# Patient Record
Sex: MALE | Race: OTHER | NOT HISPANIC OR LATINO
[De-identification: names, ages, dates, MRNs, and addresses within clinical notes are randomized per-mention and may not be internally consistent; named-entity substitution may affect disease eponyms.]

---

## 2018-07-30 PROBLEM — Z83.3 FAMILY HISTORY OF TYPE 2 DIABETES MELLITUS: Status: ACTIVE | Noted: 2018-07-30

## 2018-07-30 PROBLEM — M79.1 MUSCLE PAIN: Status: ACTIVE | Noted: 2018-07-30

## 2018-07-30 PROBLEM — M62.81 MUSCLE WEAKNESS: Status: ACTIVE | Noted: 2018-07-30

## 2018-07-30 PROBLEM — Z87.442 HISTORY OF KIDNEY STONES: Status: RESOLVED | Noted: 2018-07-30 | Resolved: 2018-07-30

## 2018-07-31 ENCOUNTER — APPOINTMENT (OUTPATIENT)
Dept: PEDIATRIC MEDICAL GENETICS | Facility: CLINIC | Age: 58
End: 2018-07-31
Payer: COMMERCIAL

## 2018-07-31 DIAGNOSIS — Z87.442 PERSONAL HISTORY OF URINARY CALCULI: ICD-10-CM

## 2018-07-31 DIAGNOSIS — M62.81 MUSCLE WEAKNESS (GENERALIZED): ICD-10-CM

## 2018-07-31 DIAGNOSIS — Z83.3 FAMILY HISTORY OF DIABETES MELLITUS: ICD-10-CM

## 2018-07-31 DIAGNOSIS — M79.1 MYALGIA: ICD-10-CM

## 2018-07-31 DIAGNOSIS — R79.89 OTHER SPECIFIED ABNORMAL FINDINGS OF BLOOD CHEMISTRY: ICD-10-CM

## 2018-07-31 LAB
ALBUMIN SERPL ELPH-MCNC: 5 G/DL
ALP BLD-CCNC: 63 U/L
ALT SERPL-CCNC: 41 U/L
ANION GAP SERPL CALC-SCNC: 15 MMOL/L
ANION GAP SERPL CALC-SCNC: 19 MMOL/L
AST SERPL-CCNC: 29 U/L
BILIRUB SERPL-MCNC: 0.5 MG/DL
BUN SERPL-MCNC: 17 MG/DL
BUN SERPL-MCNC: 18 MG/DL
CALCIUM SERPL-MCNC: 9.4 MG/DL
CALCIUM SERPL-MCNC: 9.9 MG/DL
CHLORIDE SERPL-SCNC: 103 MMOL/L
CHLORIDE SERPL-SCNC: 104 MMOL/L
CK SERPL-CCNC: 90 U/L
CO2 SERPL-SCNC: 22 MMOL/L
CO2 SERPL-SCNC: 24 MMOL/L
CREAT SERPL-MCNC: 0.97 MG/DL
CREAT SERPL-MCNC: 1.09 MG/DL
GLUCOSE SERPL-MCNC: 108 MG/DL
GLUCOSE SERPL-MCNC: 83 MG/DL
LACTATE BLDA-MCNC: 1.2 MMOL/L
LACTATE BLDA-MCNC: 5 MMOL/L
POTASSIUM SERPL-SCNC: 4.9 MMOL/L
POTASSIUM SERPL-SCNC: 4.9 MMOL/L
PROT SERPL-MCNC: 6.9 G/DL
SODIUM SERPL-SCNC: 143 MMOL/L
SODIUM SERPL-SCNC: 144 MMOL/L

## 2018-07-31 PROCEDURE — XXXXX: CPT

## 2018-08-02 PROBLEM — R79.89 DECREASED TESTOSTERONE LEVEL: Status: ACTIVE | Noted: 2018-08-02

## 2018-08-03 LAB
AMINO ACIDS FLD-SCNC: NORMAL
CARN ESTERS SERPL-MCNC: 14.4 UMOL/L
CARNITINE FREE SERPL-SCNC: 61.9 UMOL/L
CARNITINE FREE SFR SERPL: 0.2 UMOL/L
CARNITINE SERPL-SCNC: 76.3 UMOL/L
ORGANIC ACIDS UR-MCNC: NORMAL

## 2018-08-05 LAB — ACYLCARNITINE SERPL-MCNC: NORMAL

## 2018-08-07 LAB
TESTOST BND SERPL-MCNC: 15.4 PG/ML
TESTOST SERPL-MCNC: 349.7 NG/DL

## 2020-07-28 ENCOUNTER — TRANSCRIPTION ENCOUNTER (OUTPATIENT)
Age: 60
End: 2020-07-28

## 2020-09-21 ENCOUNTER — APPOINTMENT (OUTPATIENT)
Dept: MRI IMAGING | Facility: CLINIC | Age: 60
End: 2020-09-21

## 2022-02-07 ENCOUNTER — TRANSCRIPTION ENCOUNTER (OUTPATIENT)
Age: 62
End: 2022-02-07

## 2025-04-10 ENCOUNTER — INPATIENT (INPATIENT)
Facility: HOSPITAL | Age: 65
LOS: 0 days | Discharge: LEFT BEFORE DISCHARGE | DRG: 313 | End: 2025-04-11
Attending: INTERNAL MEDICINE | Admitting: INTERNAL MEDICINE
Payer: COMMERCIAL

## 2025-04-10 VITALS
OXYGEN SATURATION: 98 % | TEMPERATURE: 98 F | SYSTOLIC BLOOD PRESSURE: 168 MMHG | RESPIRATION RATE: 16 BRPM | HEIGHT: 65 IN | HEART RATE: 71 BPM | WEIGHT: 149.91 LBS | DIASTOLIC BLOOD PRESSURE: 82 MMHG

## 2025-04-10 DIAGNOSIS — R07.9 CHEST PAIN, UNSPECIFIED: ICD-10-CM

## 2025-04-10 LAB
ALBUMIN SERPL ELPH-MCNC: 4.6 G/DL — SIGNIFICANT CHANGE UP (ref 3.3–5)
ALP SERPL-CCNC: 68 U/L — SIGNIFICANT CHANGE UP (ref 40–120)
ALT FLD-CCNC: 25 U/L — SIGNIFICANT CHANGE UP (ref 10–45)
APTT BLD: 34.1 SEC — SIGNIFICANT CHANGE UP (ref 24.5–35.6)
AST SERPL-CCNC: 27 U/L — SIGNIFICANT CHANGE UP (ref 10–40)
BASOPHILS # BLD AUTO: 0.06 K/UL — SIGNIFICANT CHANGE UP (ref 0–0.2)
BASOPHILS NFR BLD AUTO: 1.1 % — SIGNIFICANT CHANGE UP (ref 0–2)
BILIRUB SERPL-MCNC: 0.3 MG/DL — SIGNIFICANT CHANGE UP (ref 0.2–1.2)
BUN SERPL-MCNC: 16 MG/DL — SIGNIFICANT CHANGE UP (ref 7–23)
CALCIUM SERPL-MCNC: 9.6 MG/DL — SIGNIFICANT CHANGE UP (ref 8.4–10.5)
CHLORIDE SERPL-SCNC: 104 MMOL/L — SIGNIFICANT CHANGE UP (ref 96–108)
CO2 SERPL-SCNC: 21 MMOL/L — LOW (ref 22–31)
CREAT SERPL-MCNC: 1.17 MG/DL — SIGNIFICANT CHANGE UP (ref 0.5–1.3)
EGFR: 70 ML/MIN/1.73M2 — SIGNIFICANT CHANGE UP
EGFR: 70 ML/MIN/1.73M2 — SIGNIFICANT CHANGE UP
EOSINOPHIL # BLD AUTO: 0.12 K/UL — SIGNIFICANT CHANGE UP (ref 0–0.5)
EOSINOPHIL NFR BLD AUTO: 2.2 % — SIGNIFICANT CHANGE UP (ref 0–6)
GLUCOSE SERPL-MCNC: 131 MG/DL — HIGH (ref 70–99)
HCT VFR BLD CALC: 45.7 % — SIGNIFICANT CHANGE UP (ref 39–50)
HGB BLD-MCNC: 15.4 G/DL — SIGNIFICANT CHANGE UP (ref 13–17)
IMM GRANULOCYTES NFR BLD AUTO: 0.4 % — SIGNIFICANT CHANGE UP (ref 0–0.9)
INR BLD: 0.92 RATIO — SIGNIFICANT CHANGE UP (ref 0.85–1.16)
LYMPHOCYTES # BLD AUTO: 1.7 K/UL — SIGNIFICANT CHANGE UP (ref 1–3.3)
LYMPHOCYTES # BLD AUTO: 31.1 % — SIGNIFICANT CHANGE UP (ref 13–44)
MCHC RBC-ENTMCNC: 29.4 PG — SIGNIFICANT CHANGE UP (ref 27–34)
MCHC RBC-ENTMCNC: 33.7 G/DL — SIGNIFICANT CHANGE UP (ref 32–36)
MCV RBC AUTO: 87.2 FL — SIGNIFICANT CHANGE UP (ref 80–100)
MONOCYTES # BLD AUTO: 0.54 K/UL — SIGNIFICANT CHANGE UP (ref 0–0.9)
MONOCYTES NFR BLD AUTO: 9.9 % — SIGNIFICANT CHANGE UP (ref 2–14)
NEUTROPHILS # BLD AUTO: 3.03 K/UL — SIGNIFICANT CHANGE UP (ref 1.8–7.4)
NEUTROPHILS NFR BLD AUTO: 55.3 % — SIGNIFICANT CHANGE UP (ref 43–77)
NRBC BLD AUTO-RTO: 0 /100 WBCS — SIGNIFICANT CHANGE UP (ref 0–0)
NT-PROBNP SERPL-SCNC: <36 PG/ML — SIGNIFICANT CHANGE UP (ref 0–300)
PLATELET # BLD AUTO: 201 K/UL — SIGNIFICANT CHANGE UP (ref 150–400)
POTASSIUM SERPL-MCNC: 4.2 MMOL/L — SIGNIFICANT CHANGE UP (ref 3.5–5.3)
POTASSIUM SERPL-SCNC: 4.2 MMOL/L — SIGNIFICANT CHANGE UP (ref 3.5–5.3)
PROT SERPL-MCNC: 6.8 G/DL — SIGNIFICANT CHANGE UP (ref 6–8.3)
PROTHROM AB SERPL-ACNC: 10.5 SEC — SIGNIFICANT CHANGE UP (ref 9.9–13.4)
RBC # BLD: 5.24 M/UL — SIGNIFICANT CHANGE UP (ref 4.2–5.8)
RBC # FLD: 13.3 % — SIGNIFICANT CHANGE UP (ref 10.3–14.5)
SODIUM SERPL-SCNC: 143 MMOL/L — SIGNIFICANT CHANGE UP (ref 135–145)
TROPONIN T, HIGH SENSITIVITY RESULT: 8 NG/L — SIGNIFICANT CHANGE UP (ref 0–51)
WBC # BLD: 5.47 K/UL — SIGNIFICANT CHANGE UP (ref 3.8–10.5)
WBC # FLD AUTO: 5.47 K/UL — SIGNIFICANT CHANGE UP (ref 3.8–10.5)

## 2025-04-10 PROCEDURE — 93010 ELECTROCARDIOGRAM REPORT: CPT

## 2025-04-10 PROCEDURE — 99285 EMERGENCY DEPT VISIT HI MDM: CPT

## 2025-04-10 PROCEDURE — 71045 X-RAY EXAM CHEST 1 VIEW: CPT | Mod: 26

## 2025-04-10 NOTE — ED ADULT NURSE NOTE - NSSEPSISSUSPECTED_ED_A_ED
----- Message from Salome Ashford MD sent at 4/30/2021  8:12 AM CDT -----  No dm2. Prediabetes noted on fbs.  Low carb/sugar diet  Renal/lft are normal  UA is bland  flp is mild elevation of t chol and ldl but what concerns me is that her 10 year cad risk is No

## 2025-04-10 NOTE — ED PROVIDER NOTE - ATTENDING CONTRIBUTION TO CARE
Patient 64-year-old male history of hypertension high cholesterol presenting with chest pain that started this evening associated with recent CT coronary performed showing 70% blockage of his proximal LAD started on cholesterol medication elevated calcium score patient currently pain-free with no EKG changes but in light of coronary results will likely need admission for possible cath.

## 2025-04-10 NOTE — ED PROVIDER NOTE - PROGRESS NOTE DETAILS
JEF Ferguson PGY2- spoke with Dr. Parada, asked for admit to Dr. Enrique with plan for cath in AM by house cards. spoke with Dr. Enrique in regards to plan, will admit. Dr. Parada cell phone [phone]

## 2025-04-10 NOTE — ED PROVIDER NOTE - OBJECTIVE STATEMENT
The patient is a 65 y/o M with past medical history of HTN, HLD, presenting to the ED with left sided chest pain. Lasted approx 60-90 minutes, started while at rest. Patient had CT coronary approx 1 week ago done by his cardiologist Behzad Paimany ([place]) which showed 70% blockage of the LAD, was started on injectable cholesterol medication (first dose today), instructed to go to the ED if he developed any symptoms such as chest pain. CP today was associated with some shortness of breath, resolved after receiving 243 ASA by EMS. The patient is a 63 y/o M with past medical history of HTN, HLD, presenting to the ED with left sided chest pain. Lasted approx 60-90 minutes, started while at rest. Patient had CT coronary approx 1 week ago done by his cardiologist Behzad Paimany ([place]) which showed 70% blockage of the LAD, was started on injectable cholesterol medication (first dose today), instructed to go to the ED if he developed any symptoms such as chest pain. CP today was associated with some shortness of breath, resolved after receiving 243 ASA by EMS. No syncope, fever, cough, congestion, nausea, vomiting, or any other symptoms.

## 2025-04-10 NOTE — ED PROVIDER NOTE - PHYSICAL EXAMINATION
General: no acute distress  Psych: mood appropriate  Head: normocephalic; atraumatic  Eyes: conjunctivae clear bilaterally, sclerae anicteric  ENT: no nasal flaring, patent nares  Cardio: regular rate and rhythm; normal heart sounds  Resp: clear to auscultation bilaterally  GI: abdomen soft, nontender, nondistended  Neuro: A&Ox3  Skin: no rashes or bruising noted  MSK: normal movement of extremities  Lymph/Vasc: no LE edema

## 2025-04-10 NOTE — ED ADULT NURSE NOTE - OBJECTIVE STATEMENT
63 y/o male w/ pmh of HLD BIBA to ED c/o chest pain.  Pt states that he started to experience left sided chest pain around 7pm today while moving some boxes around.  Pt denies chest pain now after taking 324 of aspirin.  Pt denies SOB, n/v, dizziness, numbness/tingling, fevers/chills, urinary symptoms.  Pt states he saw his cardiologist recently who sent him for outpatient CT that revealed arterial blockages.  Pt presents to ED for further evaluation.

## 2025-04-11 ENCOUNTER — TRANSCRIPTION ENCOUNTER (OUTPATIENT)
Age: 65
End: 2025-04-11

## 2025-04-11 VITALS
OXYGEN SATURATION: 96 % | RESPIRATION RATE: 18 BRPM | TEMPERATURE: 98 F | DIASTOLIC BLOOD PRESSURE: 72 MMHG | SYSTOLIC BLOOD PRESSURE: 128 MMHG | HEART RATE: 64 BPM

## 2025-04-11 LAB
ANION GAP SERPL CALC-SCNC: 11 MMOL/L — SIGNIFICANT CHANGE UP (ref 5–17)
BUN SERPL-MCNC: 14 MG/DL — SIGNIFICANT CHANGE UP (ref 7–23)
CALCIUM SERPL-MCNC: 9.4 MG/DL — SIGNIFICANT CHANGE UP (ref 8.4–10.5)
CHLORIDE SERPL-SCNC: 106 MMOL/L — SIGNIFICANT CHANGE UP (ref 96–108)
CO2 SERPL-SCNC: 23 MMOL/L — SIGNIFICANT CHANGE UP (ref 22–31)
CREAT SERPL-MCNC: 1.17 MG/DL — SIGNIFICANT CHANGE UP (ref 0.5–1.3)
EGFR: 70 ML/MIN/1.73M2 — SIGNIFICANT CHANGE UP
EGFR: 70 ML/MIN/1.73M2 — SIGNIFICANT CHANGE UP
GLUCOSE SERPL-MCNC: 100 MG/DL — HIGH (ref 70–99)
HCT VFR BLD CALC: 50 % — SIGNIFICANT CHANGE UP (ref 39–50)
HGB BLD-MCNC: 16.3 G/DL — SIGNIFICANT CHANGE UP (ref 13–17)
MCHC RBC-ENTMCNC: 28.7 PG — SIGNIFICANT CHANGE UP (ref 27–34)
MCHC RBC-ENTMCNC: 32.6 G/DL — SIGNIFICANT CHANGE UP (ref 32–36)
MCV RBC AUTO: 88.2 FL — SIGNIFICANT CHANGE UP (ref 80–100)
NRBC BLD AUTO-RTO: 0 /100 WBCS — SIGNIFICANT CHANGE UP (ref 0–0)
PLATELET # BLD AUTO: 218 K/UL — SIGNIFICANT CHANGE UP (ref 150–400)
POTASSIUM SERPL-MCNC: 4.6 MMOL/L — SIGNIFICANT CHANGE UP (ref 3.5–5.3)
POTASSIUM SERPL-SCNC: 4.6 MMOL/L — SIGNIFICANT CHANGE UP (ref 3.5–5.3)
RBC # BLD: 5.67 M/UL — SIGNIFICANT CHANGE UP (ref 4.2–5.8)
RBC # FLD: 13.4 % — SIGNIFICANT CHANGE UP (ref 10.3–14.5)
SODIUM SERPL-SCNC: 140 MMOL/L — SIGNIFICANT CHANGE UP (ref 135–145)
TROPONIN T, HIGH SENSITIVITY RESULT: 7 NG/L — SIGNIFICANT CHANGE UP (ref 0–51)
WBC # BLD: 5.85 K/UL — SIGNIFICANT CHANGE UP (ref 3.8–10.5)
WBC # FLD AUTO: 5.85 K/UL — SIGNIFICANT CHANGE UP (ref 3.8–10.5)

## 2025-04-11 PROCEDURE — 85610 PROTHROMBIN TIME: CPT

## 2025-04-11 PROCEDURE — C1894: CPT

## 2025-04-11 PROCEDURE — C1769: CPT

## 2025-04-11 PROCEDURE — 92928 PRQ TCAT PLMT NTRAC ST 1 LES: CPT | Mod: LD

## 2025-04-11 PROCEDURE — 99152 MOD SED SAME PHYS/QHP 5/>YRS: CPT

## 2025-04-11 PROCEDURE — 93458 L HRT ARTERY/VENTRICLE ANGIO: CPT

## 2025-04-11 PROCEDURE — 93005 ELECTROCARDIOGRAM TRACING: CPT

## 2025-04-11 PROCEDURE — 71045 X-RAY EXAM CHEST 1 VIEW: CPT

## 2025-04-11 PROCEDURE — C9600: CPT | Mod: LD

## 2025-04-11 PROCEDURE — 80053 COMPREHEN METABOLIC PANEL: CPT

## 2025-04-11 PROCEDURE — 93010 ELECTROCARDIOGRAM REPORT: CPT

## 2025-04-11 PROCEDURE — C1725: CPT

## 2025-04-11 PROCEDURE — 84484 ASSAY OF TROPONIN QUANT: CPT

## 2025-04-11 PROCEDURE — 99285 EMERGENCY DEPT VISIT HI MDM: CPT

## 2025-04-11 PROCEDURE — 80048 BASIC METABOLIC PNL TOTAL CA: CPT

## 2025-04-11 PROCEDURE — C1874: CPT

## 2025-04-11 PROCEDURE — 85027 COMPLETE CBC AUTOMATED: CPT

## 2025-04-11 PROCEDURE — 85730 THROMBOPLASTIN TIME PARTIAL: CPT

## 2025-04-11 PROCEDURE — 93458 L HRT ARTERY/VENTRICLE ANGIO: CPT | Mod: 26,59

## 2025-04-11 PROCEDURE — 83880 ASSAY OF NATRIURETIC PEPTIDE: CPT

## 2025-04-11 PROCEDURE — 85025 COMPLETE CBC W/AUTO DIFF WBC: CPT

## 2025-04-11 PROCEDURE — C1887: CPT

## 2025-04-11 RX ORDER — CLOPIDOGREL BISULFATE 75 MG/1
75 TABLET, FILM COATED ORAL DAILY
Refills: 0 | Status: DISCONTINUED | OUTPATIENT
Start: 2025-04-12 | End: 2025-04-11

## 2025-04-11 RX ORDER — ASPIRIN 325 MG
1 TABLET ORAL
Qty: 30 | Refills: 0
Start: 2025-04-11 | End: 2025-05-10

## 2025-04-11 RX ORDER — ASPIRIN 325 MG
1 TABLET ORAL
Refills: 0 | DISCHARGE

## 2025-04-11 RX ORDER — CLOPIDOGREL BISULFATE 75 MG/1
1 TABLET, FILM COATED ORAL
Qty: 90 | Refills: 3
Start: 2025-04-11 | End: 2026-04-05

## 2025-04-11 RX ORDER — ASPIRIN 325 MG
81 TABLET ORAL DAILY
Refills: 0 | Status: DISCONTINUED | OUTPATIENT
Start: 2025-04-11 | End: 2025-04-11

## 2025-04-11 RX ORDER — CLOPIDOGREL BISULFATE 75 MG/1
1 TABLET, FILM COATED ORAL
Qty: 30 | Refills: 0
Start: 2025-04-11 | End: 2025-05-10

## 2025-04-11 RX ORDER — EVOLOCUMAB 140 MG/ML
140 INJECTION, SOLUTION SUBCUTANEOUS
Refills: 0 | DISCHARGE

## 2025-04-11 RX ORDER — ASPIRIN 325 MG
1 TABLET ORAL
Qty: 90 | Refills: 3
Start: 2025-04-11 | End: 2026-04-05

## 2025-04-11 RX ORDER — ACETAMINOPHEN 500 MG/5ML
650 LIQUID (ML) ORAL EVERY 6 HOURS
Refills: 0 | Status: DISCONTINUED | OUTPATIENT
Start: 2025-04-11 | End: 2025-04-11

## 2025-04-11 RX ORDER — ASPIRIN 325 MG
81 TABLET ORAL ONCE
Refills: 0 | Status: COMPLETED | OUTPATIENT
Start: 2025-04-11 | End: 2025-04-11

## 2025-04-11 RX ADMIN — Medication 81 MILLIGRAM(S): at 12:44

## 2025-04-11 RX ADMIN — Medication 75 MILLILITER(S): at 12:37

## 2025-04-11 RX ADMIN — Medication 100 MILLILITER(S): at 16:11

## 2025-04-11 RX ADMIN — Medication 500 MILLILITER(S): at 12:37

## 2025-04-11 NOTE — CHART NOTE - NSCHARTNOTEFT_GEN_A_CORE
Notified by RN patient wanting to sign out AMA. Asked patient why he wanted to leave, reports "    Patient is AAO x 3. I explained at length to the patient the risks of signing out AMA including but not limited to harm, injury, or death. I explained the risks, benefits and alternatives to treatment as well as the attendant risks of refusing treatment at this time. I offered to answer any questions and fully answered any such questions. We believe that the patient fully understands what has been explained and answered.   Attending Dr. Enrique notified and made aware of the above. Patient signed AMA form and accepts responsibility for any and all results of this decision.    Return to the ER for any new or worsening symptoms, chest pain, shortness of breath, or if any other concerns.    Pérez Dean PA-C   Department of Medicine Notified by RN patient wanting to sign out AMA. Asked patient why he wanted to leave, reports "I feel fine, I don't have to stay here for tonight. I can't sleep. Dr jean baptiste said it was fine to go."    Patient is AAO x 3. I explained at length to the patient the risks of signing out AMA including but not limited to harm, injury, or death. I explained the risks, benefits and alternatives to treatment as well as the attendant risks of refusing treatment at this time. I offered to answer any questions and fully answered any such questions. I also explained to the patient that we do not have official recommendations from cardiology for post catheterization. We believe that the patient fully understands what has been explained and answered.   Attending Dr. Enrique notified and made aware of the above. Patient signed AMA form and accepts responsibility for any and all results of this decision.    Return to the ER for any new or worsening symptoms, chest pain, shortness of breath, or if any other concerns.    Pérez Dean PA-C   Department of Medicine

## 2025-04-11 NOTE — DISCHARGE NOTE PROVIDER - HOSPITAL COURSE
You have a diagnosis of coronary artery disease and underwent a cardiac catheterization where you received a stent to your LAD coronary artery. You have been started on Aspirin 81mg daily and Plavix (Clopidogrel) 75mg daily. The procedure was done through the wrist. Please avoid any heavy lifting  (no more than 3 to 5 lbs) or strenuous activity for five days. If you develop any swelling, bleeding, hardening of the skin (hematoma formation), acute pain, numbness/tingling  in your arm or leg please contact your doctor immediately or call our 24/7 line: [phone].   NEVER miss a dose of Aspirin and Plavix to ensure your stent does not close. DO NOT STOP THESE MEDICATIONS FOR ANY REASON UNLESS OTHERWISE INDICATED BY YOUR CARDIOLOGIST BECAUSE THIS WILL PUT YOU AT RISK OF YOUR STENT CLOSING AND HAVING A HEART ATTACK OR SUDDEN SEVERE LEG PAIN DUE TO BLOCKAGE OF BLOOD FLOW TO LEG.  You have been given a Stent Card to carry with you in your wallet.  Make Photocopies or take a picture of card so you have a backup copy.  This card has important information for any possible future Radiology/MRI studies.    Please make a follow up appointment with your cardiologist within 1-2 weeks of your discharge. All of your prescriptions have been sent electronically to your pharmacy.    Cardiac Rehab (Post PCI):              *Education on benefits of Cardiac Rehab provided to patient: Yes         *Referral and Prescription Given for Cardiac Rehab : Yes         *Pt given list of locations & instructed to contact their insurance company to review list of participating providers: Yes         *Pt instructed to bring Cardiac Rehab prescription with them to Cardiology Follow up appointment for assistance with enrollment: Yes         *Pt discharged with copies detail cardiovascular history, medications, testing/treatments: Yes       AMA       You have a diagnosis of coronary artery disease and underwent a cardiac catheterization where you received a stent to your LAD coronary artery. You have been started on Aspirin 81mg daily and Plavix (Clopidogrel) 75mg daily. The procedure was done through the wrist. Please avoid any heavy lifting  (no more than 3 to 5 lbs) or strenuous activity for five days. If you develop any swelling, bleeding, hardening of the skin (hematoma formation), acute pain, numbness/tingling  in your arm or leg please contact your doctor immediately or call our 24/7 line: [phone].   NEVER miss a dose of Aspirin and Plavix to ensure your stent does not close. DO NOT STOP THESE MEDICATIONS FOR ANY REASON UNLESS OTHERWISE INDICATED BY YOUR CARDIOLOGIST BECAUSE THIS WILL PUT YOU AT RISK OF YOUR STENT CLOSING AND HAVING A HEART ATTACK OR SUDDEN SEVERE LEG PAIN DUE TO BLOCKAGE OF BLOOD FLOW TO LEG.  You have been given a Stent Card to carry with you in your wallet.  Make Photocopies or take a picture of card so you have a backup copy.  This card has important information for any possible future Radiology/MRI studies.    Please make a follow up appointment with your cardiologist within 1-2 weeks of your discharge. All of your prescriptions have been sent electronically to your pharmacy.    Cardiac Rehab (Post PCI):              *Education on benefits of Cardiac Rehab provided to patient: Yes         *Referral and Prescription Given for Cardiac Rehab : Yes         *Pt given list of locations & instructed to contact their insurance company to review list of participating providers: Yes         *Pt instructed to bring Cardiac Rehab prescription with them to Cardiology Follow up appointment for assistance with enrollment: Yes         *Pt discharged with copies detail cardiovascular history, medications, testing/treatments: Yes       Pt leaving AMA and is not medically cleared for discharge.     Patient is AAO x 3. I explained at length to the patient the risks of signing out AMA including but not limited to harm, injury, or death. I explained the risks, benefits and alternatives to treatment as well as the attendant risks of refusing treatment at this time. I offered to answer any questions and fully answered any such questions. I also explained to the patient that we do not have official recommendations from cardiology for post catheterization. We believe that the patient fully understands what has been explained and answered.   Attending Dr. Enrique notified and made aware of the above. Patient signed AMA form and accepts responsibility for any and all results of this decision.  Return to the ER for any new or worsening symptoms, chest pain, shortness of breath, or if any other concerns.     63 y/o M with past medical history of HTN, HLD, presenting to the ED with left sided chest pain. Lasted approx 60-90 minutes, started while at rest. Patient had CT coronary approx 1 week ago done by his cardiologist Behzad Paimany ([place]) which showed 70% blockage of the LAD, was started on injectable cholesterol medication (first dose today), instructed to go to the ED if he developed any symptoms such as chest pain. CP today was associated with some shortness of breath, resolved after receiving 243 ASA by EMS. No syncope, fever, cough, congestion, nausea, vomiting, or any other symptoms. (11 Apr 2025 08:29)    Hospital Course:  chest pain with abnormal ct coronary  - continue with Aspirin   - cardiac cath today- s/p 1 drug eluting stent to LAD.   - cardio consult pending (pt leaving AMA)     HLD  - cont repatha    You have a diagnosis of coronary artery disease and underwent a cardiac catheterization where you received a stent to your LAD coronary artery. You have been started on Aspirin 81mg daily and Plavix (Clopidogrel) 75mg daily. The procedure was done through the wrist. Please avoid any heavy lifting  (no more than 3 to 5 lbs) or strenuous activity for five days. If you develop any swelling, bleeding, hardening of the skin (hematoma formation), acute pain, numbness/tingling  in your arm or leg please contact your doctor immediately or call our 24/7 line: [phone].   NEVER miss a dose of Aspirin and Plavix to ensure your stent does not close. DO NOT STOP THESE MEDICATIONS FOR ANY REASON UNLESS OTHERWISE INDICATED BY YOUR CARDIOLOGIST BECAUSE THIS WILL PUT YOU AT RISK OF YOUR STENT CLOSING AND HAVING A HEART ATTACK OR SUDDEN SEVERE LEG PAIN DUE TO BLOCKAGE OF BLOOD FLOW TO LEG.  You have been given a Stent Card to carry with you in your wallet.  Make Photocopies or take a picture of card so you have a backup copy.  This card has important information for any possible future Radiology/MRI studies.    Please make a follow up appointment with your cardiologist within 1-2 weeks of your discharge. All of your prescriptions have been sent electronically to your pharmacy.    Cardiac Rehab (Post PCI):              *Education on benefits of Cardiac Rehab provided to patient: Yes         *Referral and Prescription Given for Cardiac Rehab : Yes         *Pt given list of locations & instructed to contact their insurance company to review list of participating providers: Yes         *Pt instructed to bring Cardiac Rehab prescription with them to Cardiology Follow up appointment for assistance with enrollment: Yes         *Pt discharged with copies detail cardiovascular history, medications, testing/treatments: Yes    Important Medication Changes and Reason:  -no changes     Active or Pending Issues Requiring Follow-up:  follow up appointment with your cardiologist within 1-2 weeks of your AMA leave   Advanced Directives:   [x] Full code  [ ] DNR  [ ] Hospice    Discharge Diagnoses:  Chest pain s/p 1 NASIM to LAD   Leaving AMA

## 2025-04-11 NOTE — H&P ADULT - HISTORY OF PRESENT ILLNESS
63 y/o M with past medical history of HTN, HLD, presenting to the ED with left sided chest pain. Lasted approx 60-90 minutes, started while at rest. Patient had CT coronary approx 1 week ago done by his cardiologist Behzad Paimany ([place]) which showed 70% blockage of the LAD, was started on injectable cholesterol medication (first dose today), instructed to go to the ED if he developed any symptoms such as chest pain. CP today was associated with some shortness of breath, resolved after receiving 243 ASA by EMS. No syncope, fever, cough, congestion, nausea, vomiting, or any other symptoms.

## 2025-04-11 NOTE — CONSULT NOTE ADULT - SUBJECTIVE AND OBJECTIVE BOX
DATE OF SERVICE: 04-11-25 @ 09:33    CHIEF COMPLAINT:Patient is a 64y old  Male who presents with a chief complaint of chest pain (11 Apr 2025 08:29)      HISTORY OF PRESENT ILLNESS:HPI:  63 y/o M with past medical history of HTN, HLD, presenting to the ED with left sided chest pain. Lasted approx 60-90 minutes, started while at rest. Patient had CT coronary approx 1 week ago done by his cardiologist Behzad Paimany ([place]) which showed 70% blockage of the LAD, was started on injectable cholesterol medication (first dose today), instructed to go to the ED if he developed any symptoms such as chest pain. CP today was associated with some shortness of breath, resolved after receiving 243 ASA by EMS. No syncope, fever, cough, congestion, nausea, vomiting, or any other symptoms. (11 Apr 2025 08:29)      PAST MEDICAL & SURGICAL HISTORY:  No pertinent past medical history      No significant past surgical history              MEDICATIONS:                  FAMILY HISTORY:      Non-contributory    SOCIAL HISTORY:    [ ] Tobacco  [ ] Drugs  [ ] Alcohol    Allergies    No Known Allergies    Intolerances    	    REVIEW OF SYSTEMS:  CONSTITUTIONAL: No fever  EYES: No eye pain, visual disturbances, or discharge  ENMT:  No difficulty hearing, tinnitus  NECK: No pain or stiffness  RESPIRATORY: No cough, wheezing,  CARDIOVASCULAR: No chest pain, palpitations, passing out, dizziness, or leg swelling  GASTROINTESTINAL:  No nausea, vomiting, diarrhea or constipation. No melena.  GENITOURINARY: No dysuria, hematuria  NEUROLOGICAL: No stroke like symptoms  SKIN: No burning or lesions   ENDOCRINE: No heat or cold intolerance  MUSCULOSKELETAL: No joint pain or swelling  PSYCHIATRIC: No  anxiety, mood swings  HEME/LYMPH: No bleeding gums  ALLERGY AND IMMUNOLOGIC: No hives or eczema	    All other ROS negative    PHYSICAL EXAM:  T(C): 36.5 (04-11-25 @ 04:23), Max: 36.8 (04-10-25 @ 20:37)  HR: 73 (04-11-25 @ 04:23) (67 - 75)  BP: 115/69 (04-11-25 @ 04:23) (115/69 - 168/82)  RR: 18 (04-11-25 @ 04:23) (16 - 18)  SpO2: 98% (04-11-25 @ 04:23) (98% - 98%)  Wt(kg): --  I&O's Summary      Appearance: Normal	  HEENT:   Normal oral mucosa, EOMI	  Cardiovascular:  S1 S2, No JVD,    Respiratory: Lungs clear to auscultation	  Psychiatry: Alert  Gastrointestinal:  Soft, Non-tender, + BS	  Skin: No rashes   Neurologic: Non-focal  Extremities:  No edema  Vascular: Peripheral pulses palpable    	    	  	  CARDIAC MARKERS:  Labs personally reviewed by me                                  16.3   5.85  )-----------( 218      ( 11 Apr 2025 09:04 )             50.0     04-10    143  |  104  |  16  ----------------------------<  131[H]  4.2   |  21[L]  |  1.17    Ca    9.6      10 Apr 2025 21:37    TPro  6.8  /  Alb  4.6  /  TBili  0.3  /  DBili  x   /  AST  27  /  ALT  25  /  AlkPhos  68  04-10          EKG: Personally reviewed by me -   Radiology: Personally reviewed by me -     < from: Xray Chest 1 View- PORTABLE-Urgent (04.10.25 @ 21:37) >  IMPRESSION:  Clear lungs.    --- End of Report ---      Assessment /Plan:     63 y/o M with past medical history of HTN, HLD, presenting to the ED with left sided chest pain. Lasted approx 60-90 minutes, started while at rest. Patient had CT coronary approx 1 week ago done by his cardiologist Behzad Paimany ([place]) which showed 70% blockage of the LAD, was started on injectable cholesterol medication (first dose today), instructed to go to the ED if he developed any symptoms such as chest pain. CP today was associated with some shortness of breath, resolved after receiving 243 ASA by EMS. No syncope, fever, cough, congestion, nausea, vomiting, or any other symptoms.    1. Chest Pain  - Trop neg x 2  - EKG  - OP CTA cors - 70% blockage of LAD  -     2. HLD  -     3. HTN  - BP well controlled      Differential diagnosis and plan of care discussed with patient after the evaluation. Counseling on diet, nutritional counseling, weight management, exercise and medication compliance was done.   Advanced care planning/advanced directives discussed with patient/family. DNR status including forceful chest compressions to attempt to restart the heart, ventilator support/artificial breathing, electric shock, artificial nutrition, health care proxy, Molst form all discussed with pt. Pt wishes to consider. Sixteen minutes spent on discussing advanced directives.       ARIK Gaffney DO [facility]  Cardiovascular Medicine  [address] [place] [address] Dr, [address]  Available for call or text via [employer]  Office [phone]  \   DATE OF SERVICE: 04-11-25 @ 09:33    CHIEF COMPLAINT:Patient is a 64y old  Male who presents with a chief complaint of chest pain (11 Apr 2025 08:29)      HISTORY OF PRESENT ILLNESS:HPI:  63 y/o M with past medical history of HTN, HLD, presenting to the ED with left sided chest pain. Lasted approx 60-90 minutes, started while at rest. Patient had CT coronary approx 1 week ago done by his cardiologist Behzad Paimany ([place]) which showed 70% blockage of the LAD, was started on injectable cholesterol medication (first dose today), instructed to go to the ED if he developed any symptoms such as chest pain. CP today was associated with some shortness of breath, resolved after receiving 243 ASA by EMS. No syncope, fever, cough, congestion, nausea, vomiting, or any other symptoms. (11 Apr 2025 08:29)      PAST MEDICAL & SURGICAL HISTORY:  No pertinent past medical history      No significant past surgical history    MEDICATIONS:          FAMILY HISTORY:      Non-contributory    SOCIAL HISTORY:    [-] Tobacco - not current smoker    Allergies    No Known Allergies    Intolerances    	    REVIEW OF SYSTEMS:  CONSTITUTIONAL: No fever  EYES: No eye pain, visual disturbances, or discharge  ENMT:  No difficulty hearing, tinnitus  NECK: No pain or stiffness  RESPIRATORY: No cough, wheezing,  CARDIOVASCULAR: No chest pain, palpitations, passing out, dizziness, or leg swelling  GASTROINTESTINAL:  No nausea, vomiting, diarrhea or constipation. No melena.  GENITOURINARY: No dysuria, hematuria  NEUROLOGICAL: No stroke like symptoms  SKIN: No burning or lesions   ENDOCRINE: No heat or cold intolerance  MUSCULOSKELETAL: No joint pain or swelling  PSYCHIATRIC: No  anxiety, mood swings  HEME/LYMPH: No bleeding gums  ALLERGY AND IMMUNOLOGIC: No hives or eczema	    All other ROS negative    PHYSICAL EXAM:  T(C): 36.5 (04-11-25 @ 04:23), Max: 36.8 (04-10-25 @ 20:37)  HR: 73 (04-11-25 @ 04:23) (67 - 75)  BP: 115/69 (04-11-25 @ 04:23) (115/69 - 168/82)  RR: 18 (04-11-25 @ 04:23) (16 - 18)  SpO2: 98% (04-11-25 @ 04:23) (98% - 98%)  Wt(kg): --  I&O's Summary      Appearance: Normal	  HEENT:   Normal oral mucosa, EOMI	  Cardiovascular:  S1 S2, No JVD,    Respiratory: Lungs clear to auscultation	  Psychiatry: Alert  Gastrointestinal:  Soft, Non-tender, + BS	  Skin: No rashes   Neurologic: Non-focal  Extremities:  No edema  Vascular: Peripheral pulses palpable    	    	  	  CARDIAC MARKERS:  Labs personally reviewed by me    Troponin T, High Sensitivity Result: 7: *  Troponin T, High Sensitivity Result: 8: *                           16.3   5.85  )-----------( 218      ( 11 Apr 2025 09:04 )             50.0     04-10    143  |  104  |  16  ----------------------------<  131[H]  4.2   |  21[L]  |  1.17    Ca    9.6      10 Apr 2025 21:37    TPro  6.8  /  Alb  4.6  /  TBili  0.3  /  DBili  x   /  AST  27  /  ALT  25  /  AlkPhos  68  04-10          EKG: Personally reviewed by me -   Radiology: Personally reviewed by me -     < from: Xray Chest 1 View- PORTABLE-Urgent (04.10.25 @ 21:37) >  IMPRESSION:  Clear lungs.    --- End of Report ---      Assessment /Plan:     63 y/o M with past medical history of HTN, HLD, presenting to the ED with left sided chest pain. Lasted approx 60-90 minutes, started while at rest. Patient had CT coronary approx 1 week ago done by his cardiologist Behzad Paimany ([place]) which showed 70% blockage of the LAD, was started on injectable cholesterol medication (first dose today), instructed to go to the ED if he developed any symptoms such as chest pain. CP today was associated with some shortness of breath, resolved after receiving 243 ASA by EMS. No syncope, fever, cough, congestion, nausea, vomiting, or any other symptoms.    1. Chest Pain  - Trop neg x 2  - OP CTA cors - 70% blockage of LAD  - Risks vs. benefits of LHC discussed with pt and wife. Pt agreeable. LHC today 4/11    2. HLD  - OP on Repatha    3. HTN  - BP well controlled off meds      Differential diagnosis and plan of care discussed with patient after the evaluation. Counseling on diet, nutritional counseling, weight management, exercise and medication compliance was done.   Advanced care planning/advanced directives discussed with patient/family. DNR status including forceful chest compressions to attempt to restart the heart, ventilator support/artificial breathing, electric shock, artificial nutrition, health care proxy, Molst form all discussed with pt. Pt wishes to consider. Sixteen minutes spent on discussing advanced directives.       ARIK Gaffney,  [facility]  Cardiovascular Medicine  [address] [place] [address], [address]  Available for call or text via [employer] TEAM[employer]  Office [phone]  \   DATE OF SERVICE: 04-11-25 @ 09:33    CHIEF COMPLAINT:Patient is a 64y old  Male who presents with a chief complaint of chest pain (11 Apr 2025 08:29)      HISTORY OF PRESENT ILLNESS:HPI:  63 y/o M with past medical history of HTN, HLD, presenting to the ED with left sided chest pain. Lasted approx 60-90 minutes, started while at rest. Patient had CT coronary approx 1 week ago done by his cardiologist Behzad Paimany ([place]) which showed 70% blockage of the LAD, was started on injectable cholesterol medication (first dose today), instructed to go to the ED if he developed any symptoms such as chest pain. CP today was associated with some shortness of breath, resolved after receiving 243 ASA by EMS. No syncope, fever, cough, congestion, nausea, vomiting, or any other symptoms. (11 Apr 2025 08:29)      PAST MEDICAL & SURGICAL HISTORY:  No pertinent past medical history      No significant past surgical history    MEDICATIONS:          FAMILY HISTORY:      Non-contributory    SOCIAL HISTORY:    [-] Tobacco - not current smoker    Allergies    No Known Allergies    Intolerances    	    REVIEW OF SYSTEMS:  CONSTITUTIONAL: No fever  EYES: No eye pain, visual disturbances, or discharge  ENMT:  No difficulty hearing, tinnitus  NECK: No pain or stiffness  RESPIRATORY: No cough, wheezing,  CARDIOVASCULAR: No chest pain, palpitations, passing out, dizziness, or leg swelling  GASTROINTESTINAL:  No nausea, vomiting, diarrhea or constipation. No melena.  GENITOURINARY: No dysuria, hematuria  NEUROLOGICAL: No stroke like symptoms  SKIN: No burning or lesions   ENDOCRINE: No heat or cold intolerance  MUSCULOSKELETAL: No joint pain or swelling  PSYCHIATRIC: No  anxiety, mood swings  HEME/LYMPH: No bleeding gums  ALLERGY AND IMMUNOLOGIC: No hives or eczema	    All other ROS negative    PHYSICAL EXAM:  T(C): 36.5 (04-11-25 @ 04:23), Max: 36.8 (04-10-25 @ 20:37)  HR: 73 (04-11-25 @ 04:23) (67 - 75)  BP: 115/69 (04-11-25 @ 04:23) (115/69 - 168/82)  RR: 18 (04-11-25 @ 04:23) (16 - 18)  SpO2: 98% (04-11-25 @ 04:23) (98% - 98%)  Wt(kg): --  I&O's Summary      Appearance: Normal	  HEENT:   Normal oral mucosa, EOMI	  Cardiovascular:  S1 S2, No JVD,    Respiratory: Lungs clear to auscultation	  Psychiatry: Alert  Gastrointestinal:  Soft, Non-tender, + BS	  Skin: No rashes   Neurologic: Non-focal  Extremities:  No edema  Vascular: Peripheral pulses palpable    	    	  	  CARDIAC MARKERS:  Labs personally reviewed by me    Troponin T, High Sensitivity Result: 7: *  Troponin T, High Sensitivity Result: 8: *                           16.3   5.85  )-----------( 218      ( 11 Apr 2025 09:04 )             50.0     04-10    143  |  104  |  16  ----------------------------<  131[H]  4.2   |  21[L]  |  1.17    Ca    9.6      10 Apr 2025 21:37    TPro  6.8  /  Alb  4.6  /  TBili  0.3  /  DBili  x   /  AST  27  /  ALT  25  /  AlkPhos  68  04-10          EKG: Personally reviewed by me - NSR  Radiology: Personally reviewed by me -     < from: Xray Chest 1 View- PORTABLE-Urgent (04.10.25 @ 21:37) >  IMPRESSION:  Clear lungs.    --- End of Report ---        Assessment /Plan:     63 y/o M with past medical history of HTN, HLD, presenting to the ED with left sided chest pain. Lasted approx 60-90 minutes, started while at rest. Patient had CT coronary approx 1 week ago done by his cardiologist Behzad Paimany ([place]) which showed 70% blockage of the LAD, was started on injectable cholesterol medication (first dose today), instructed to go to the ED if he developed any symptoms such as chest pain. CP today was associated with some shortness of breath, resolved after receiving 243 ASA by EMS. No syncope, fever, cough, congestion, nausea, vomiting, or any other symptoms.    1. Chest Pain  - Trop neg x 2  - OP CTA cors - 70% blockage of LAD  - Risks vs. benefits of LHC discussed with pt and wife. Pt agreeable. LHC 4/11  - s/p NASIM to LAD    2. HLD  - OP on Repatha    3. HTN  - BP well controlled off meds        Differential diagnosis and plan of care discussed with patient after the evaluation. Counseling on diet, nutritional counseling, weight management, exercise and medication compliance was done.   Advanced care planning/advanced directives discussed with patient/family. DNR status including forceful chest compressions to attempt to restart the heart, ventilator support/artificial breathing, electric shock, artificial nutrition, health care proxy, Molst form all discussed with pt. Pt wishes to consider. Sixteen minutes spent on discussing advanced directives.       ARIK Gaffney DO [facility]  Cardiovascular Medicine  [address] [place] [address] Dr, [address]  Available for call or text via [employer] TE[employer]  Office [phone]  \

## 2025-04-11 NOTE — DISCHARGE NOTE PROVIDER - NSDCMRMEDTOKEN_GEN_ALL_CORE_FT
aspirin 81 mg oral delayed release tablet: 1 tab(s) orally once a day MDD: 1  cardiac rehab 2-3x week for 12 weeks: this is a referral only- please follow up with your cardiologist for official srcipt  clopidogrel 75 mg oral tablet: 1 tab(s) orally once a day DO NOT STOP MDD: 1  Repatha Prefilled Syringe 140 mg/mL subcutaneous solution: 140 milligram(s) subcutaneously every 2 weeks   aspirin 81 mg oral delayed release tablet: 1 tab(s) orally once a day MDD: 1  aspirin 81 mg oral delayed release tablet: 1 tab(s) orally once a day  clopidogrel 75 mg oral tablet: 1 tab(s) orally once a day DO NOT STOP MDD: 1  Plavix 75 mg oral tablet: 1 tab(s) orally once a day

## 2025-04-11 NOTE — H&P ADULT - NSHPLABSRESULTS_GEN_ALL_CORE
LABS:                        16.3   5.85  )-----------( 218      ( 11 Apr 2025 09:04 )             50.0     04-11    140  |  106  |  14  ----------------------------<  100[H]  4.6   |  23  |  1.17    Ca    9.4      11 Apr 2025 09:04    TPro  6.8  /  Alb  4.6  /  TBili  0.3  /  DBili  x   /  AST  27  /  ALT  25  /  AlkPhos  68  04-10    PT/INR - ( 10 Apr 2025 21:37 )   PT: 10.5 sec;   INR: 0.92 ratio         PTT - ( 10 Apr 2025 21:37 )  PTT:34.1 sec  Urinalysis Basic - ( 11 Apr 2025 09:04 )    Color: x / Appearance: x / SG: x / pH: x  Gluc: 100 mg/dL / Ketone: x  / Bili: x / Urobili: x   Blood: x / Protein: x / Nitrite: x   Leuk Esterase: x / RBC: x / WBC x   Sq Epi: x / Non Sq Epi: x / Bacteria: x            RADIOLOGY & ADDITIONAL TESTS:

## 2025-04-11 NOTE — DISCHARGE NOTE PROVIDER - CARE PROVIDER_API CALL
Emilio Salazar  Cardiology  [address] [facility] [address] [facility], [address]  [place] [address]  Phone: [phone]  Fax: [phone]  Follow Up Time: 1 week

## 2025-04-11 NOTE — DISCHARGE NOTE NURSING/CASE MANAGEMENT/SOCIAL WORK - PATIENT PORTAL LINK FT
You can access the FollowMyHealth Patient Portal offered by [facility] by registering at the following website: [email or web address]. By joining "[employer]’s FollowMyHealth portal, you will also be able to view your health information using other applications (apps) compatible with our system.

## 2025-04-11 NOTE — PHARMACOTHERAPY INTERVENTION NOTE - COMMENTS
Collected medication history from patient and pharmacy. Home medication list updated in prescription writer/ outpatient medication review.    Home medications:  aspirin 81 mg oral delayed release tablet: 1 tab(s) orally once a day  Repatha Prefilled Syringe 140 mg/mL subcutaneous solution: 140 milligram(s) subcutaneously every 2 weeks    Twin McdanielD, BCPS  Clinical Pharmacy Specialist  Available on [employer]  Cell: [phone]

## 2025-04-11 NOTE — DISCHARGE NOTE NURSING/CASE MANAGEMENT/SOCIAL WORK - FINANCIAL ASSISTANCE
[facility] provides services at a reduced cost to those who are determined to be eligible through [facility]’s financial assistance program. Information regarding [facility]’s financial assistance program can be found by going to [email or web address] or by calling [phone].

## 2025-04-11 NOTE — DISCHARGE NOTE PROVIDER - NSDCCPTREATMENT_GEN_ALL_CORE_FT
PRINCIPAL PROCEDURE  Procedure: Left heart cardiac cath  Findings and Treatment: 1 drug eluting stent was placed to LAD via right radial artery

## 2025-04-11 NOTE — CHART NOTE - NSCHARTNOTEFT_GEN_A_CORE
TYRONE BRADY is a 63 yo M s/p cardiac cath via right radial access.  Site is stable with no hematoma, active bleed or swelling.  Dressing is clean/dry/intact.  RRA pulses are easily palpable B/L. Patient denies pain, numbness, tingling, CP, SOB. VSS.     T(C): 36.7 (04-11-25 @ 19:30), Max: 36.7 (04-11-25 @ 11:19)  HR: 71 (04-11-25 @ 19:30) (58 - 76)  BP: 157/88 (04-11-25 @ 19:30) (115/69 - 159/73)  RR: 18 (04-11-25 @ 19:30) (16 - 18)  SpO2: 96% (04-11-25 @ 19:30) (93% - 99%)

## 2025-04-11 NOTE — H&P ADULT - ASSESSMENT
65 y/o M with past medical history of HTN, HLD, presenting to the ED with left sided chest pain. Lasted approx 60-90 minutes, started while at rest. Patient had CT coronary approx 1 week ago done by his cardiologist Behzad Paimany ([place]) which showed 70% blockage of the LAD, was started on injectable cholesterol medication (first dose today), instructed to go to the ED if he developed any symptoms such as chest pain. CP today was associated with some shortness of breath, resolved after receiving 243 ASA by EMS. No syncope, fever, cough, congestion, nausea, vomiting, or any other symptoms. 65 y/o M with past medical history of HTN, HLD, presenting to the ED with left sided chest pain. Lasted approx 60-90 minutes, started while at rest. Patient had CT coronary approx 1 week ago done by his cardiologist Behzad Paimany ([place]) which showed 70% blockage of the LAD, was started on injectable cholesterol medication (first dose today), instructed to go to the ED if he developed any symptoms such as chest pain. CP today was associated with some shortness of breath, resolved after receiving 243 ASA by EMS. No syncope, fever, cough, congestion, nausea, vomiting, or any other symptoms.    chest pain with abnormal ct coronary  - asa  - cardiac cath today  - cardio consult    HLD  - cont repatha

## 2025-06-04 ENCOUNTER — APPOINTMENT (OUTPATIENT)
Dept: VASCULAR SURGERY | Facility: CLINIC | Age: 65
End: 2025-06-04

## 2025-08-13 ENCOUNTER — APPOINTMENT (OUTPATIENT)
Dept: RADIOLOGY | Facility: CLINIC | Age: 65
End: 2025-08-13
Payer: COMMERCIAL

## 2025-08-13 PROCEDURE — 77080 DXA BONE DENSITY AXIAL: CPT

## 2025-09-21 ENCOUNTER — NON-APPOINTMENT (OUTPATIENT)
Age: 65
End: 2025-09-21